# Patient Record
Sex: FEMALE | URBAN - METROPOLITAN AREA
[De-identification: names, ages, dates, MRNs, and addresses within clinical notes are randomized per-mention and may not be internally consistent; named-entity substitution may affect disease eponyms.]

---

## 2020-09-30 ENCOUNTER — OFFICE VISIT (OUTPATIENT)
Dept: URBAN - METROPOLITAN AREA CLINIC 40 | Facility: CLINIC | Age: 40
End: 2020-09-30

## 2023-12-05 ENCOUNTER — LAB OUTSIDE AN ENCOUNTER (OUTPATIENT)
Dept: URBAN - METROPOLITAN AREA CLINIC 40 | Facility: CLINIC | Age: 43
End: 2023-12-05

## 2023-12-05 ENCOUNTER — OFFICE VISIT (OUTPATIENT)
Dept: URBAN - METROPOLITAN AREA CLINIC 40 | Facility: CLINIC | Age: 43
End: 2023-12-05
Payer: COMMERCIAL

## 2023-12-05 ENCOUNTER — OFFICE VISIT (OUTPATIENT)
Dept: URBAN - METROPOLITAN AREA CLINIC 40 | Facility: CLINIC | Age: 43
End: 2023-12-05

## 2023-12-05 VITALS
WEIGHT: 167 LBS | TEMPERATURE: 97.9 F | DIASTOLIC BLOOD PRESSURE: 64 MMHG | BODY MASS INDEX: 30.73 KG/M2 | HEIGHT: 62 IN | HEART RATE: 85 BPM | SYSTOLIC BLOOD PRESSURE: 116 MMHG

## 2023-12-05 DIAGNOSIS — R10.13 EPIGASTRIC ABDOMINAL PAIN: ICD-10-CM

## 2023-12-05 DIAGNOSIS — R11.0 NAUSEA: ICD-10-CM

## 2023-12-05 PROCEDURE — 99203 OFFICE O/P NEW LOW 30 MIN: CPT | Performed by: PHYSICIAN ASSISTANT

## 2023-12-05 RX ORDER — PANTOPRAZOLE SODIUM 20 MG/1
1 TABLET TABLET, DELAYED RELEASE ORAL ONCE A DAY
Status: ACTIVE | COMMUNITY

## 2023-12-05 RX ORDER — PANTOPRAZOLE SODIUM 20 MG/1
1 TABLET TABLET, DELAYED RELEASE ORAL ONCE A DAY
Qty: 30 TABLET | Refills: 1

## 2023-12-05 RX ORDER — DICLOFENAC SODIUM 75 MG/1
1 TABLET AS NEEDED TABLET, DELAYED RELEASE ORAL TWICE A DAY
Status: ACTIVE | COMMUNITY

## 2023-12-05 NOTE — PHYSICAL EXAM GASTROINTESTINAL
Abdomen , soft,  mild TTP in ASHLY ,no guarding or rebound tenderness, nondistended , no guarding or rigidity , no masses palpable , normal bowel sounds , Liver and Spleen,  no hepatosplenomegaly , liver nontender

## 2023-12-05 NOTE — HPI-TODAY'S VISIT:
Ms. Greco is a 43-year-old  female who presents to the office today complaining of epigastric abdominal pain, nausea vomiting.  I see she was seen at [facility] ED on December 1, 2023 with these complaints and did have a CT of the abdomen and pelvis with IV contrast that was noncontributory.  Normal liver, gallbladder, biliary tree, pancreas and kidneys.  The stomach and abdominal, pelvic bowel loops are all normal as well as appendix.  Per review notes at her time of visit, she complained of pain radiating to her back and had not been seen by GI in the past.  History of asthma.  Non-smoker.  No use of alcohol or NSAIDs. She was given prescription for pantoprazole 40 mg.  I do see lipase, hepatic function panel, CBC essentially normal.  Pregnancy test negative. LMP normal. Admits she has only taken two dose of pantoprazole since ED discharge as she wanted to first discuss symptoms at our office. She has been on Prilosec in the past, which seemed to help. Dark stool after taking pepto bismol. No current rectal pain, bleeding or melena. Denies dysphagia. No chest pain, SOB reported.

## 2023-12-18 ENCOUNTER — OFFICE VISIT (OUTPATIENT)
Dept: URBAN - METROPOLITAN AREA SURGERY CENTER 30 | Facility: SURGERY CENTER | Age: 43
End: 2023-12-18

## 2023-12-18 ENCOUNTER — CLAIMS CREATED FROM THE CLAIM WINDOW (OUTPATIENT)
Dept: URBAN - METROPOLITAN AREA SURGERY CENTER 30 | Facility: SURGERY CENTER | Age: 43
End: 2023-12-18
Payer: COMMERCIAL

## 2023-12-18 DIAGNOSIS — R11.2 NAUSEA & VOMITING: ICD-10-CM

## 2023-12-18 DIAGNOSIS — R11.2 ACUTE NAUSEA WITH NONBILIOUS VOMITING: ICD-10-CM

## 2023-12-18 DIAGNOSIS — K20.80 OTHER ESOPHAGITIS WITHOUT BLEEDING: ICD-10-CM

## 2023-12-18 DIAGNOSIS — K31.89 OTHER DISEASES OF STOMACH AND DUODENUM: ICD-10-CM

## 2023-12-18 DIAGNOSIS — R10.13 EPIGASTRIC ABDOMINAL PAIN: ICD-10-CM

## 2023-12-18 DIAGNOSIS — K29.30 CHRONIC SUPERFICIAL GASTRITIS: ICD-10-CM

## 2023-12-18 DIAGNOSIS — R10.13 ABDOMINAL DISCOMFORT, EPIGASTRIC: ICD-10-CM

## 2023-12-18 DIAGNOSIS — K29.70 GASTRITIS: ICD-10-CM

## 2023-12-18 PROCEDURE — G8907 PT DOC NO EVENTS ON DISCHARG: HCPCS | Performed by: INTERNAL MEDICINE

## 2023-12-18 PROCEDURE — 43239 EGD BIOPSY SINGLE/MULTIPLE: CPT | Performed by: INTERNAL MEDICINE

## 2023-12-18 PROCEDURE — 00731 ANES UPR GI NDSC PX NOS: CPT | Performed by: NURSE ANESTHETIST, CERTIFIED REGISTERED

## 2024-01-04 ENCOUNTER — TELEPHONE ENCOUNTER (OUTPATIENT)
Dept: URBAN - METROPOLITAN AREA CLINIC 40 | Facility: CLINIC | Age: 44
End: 2024-01-04

## 2024-01-04 RX ORDER — PANTOPRAZOLE SODIUM 20 MG/1
1 TABLET TABLET, DELAYED RELEASE ORAL ONCE A DAY
Qty: 30 TABLET | Refills: 1

## 2024-01-08 ENCOUNTER — OFFICE VISIT (OUTPATIENT)
Dept: URBAN - METROPOLITAN AREA CLINIC 40 | Facility: CLINIC | Age: 44
End: 2024-01-08
Payer: COMMERCIAL

## 2024-01-08 VITALS
HEART RATE: 74 BPM | WEIGHT: 168 LBS | HEIGHT: 62 IN | SYSTOLIC BLOOD PRESSURE: 128 MMHG | DIASTOLIC BLOOD PRESSURE: 70 MMHG | TEMPERATURE: 97 F | BODY MASS INDEX: 30.91 KG/M2

## 2024-01-08 DIAGNOSIS — K29.70 GASTRITIS: ICD-10-CM

## 2024-01-08 DIAGNOSIS — R10.13 EPIGASTRIC ABDOMINAL PAIN: ICD-10-CM

## 2024-01-08 DIAGNOSIS — R11.0 NAUSEA: ICD-10-CM

## 2024-01-08 DIAGNOSIS — R89.7 ABNORMAL BIOPSY RESULT: ICD-10-CM

## 2024-01-08 PROBLEM — 165325009: Status: ACTIVE | Noted: 2024-01-08

## 2024-01-08 PROCEDURE — 99214 OFFICE O/P EST MOD 30 MIN: CPT | Performed by: PHYSICIAN ASSISTANT

## 2024-01-08 RX ORDER — PANTOPRAZOLE SODIUM 20 MG/1
1 TABLET TABLET, DELAYED RELEASE ORAL ONCE A DAY
Qty: 30 TABLET | Refills: 1 | Status: ACTIVE | COMMUNITY

## 2024-01-08 RX ORDER — PANTOPRAZOLE SODIUM 20 MG/1
1 TABLET TABLET, DELAYED RELEASE ORAL ONCE A DAY
OUTPATIENT

## 2024-01-08 RX ORDER — FOLIC ACID 1 MG/1
1 TABLET TABLET ORAL ONCE A DAY
Status: ACTIVE | COMMUNITY

## 2024-01-08 NOTE — HPI-TODAY'S VISIT:
Ms. Greco is a 43-year-old  female who was seen 12/5/23, complaining of epigastric abdominal pain, nausea vomiting.  I see she was seen at [facility] ED on December 1, 2023 with these complaints and did have a CT of the abdomen and pelvis with IV contrast that was noncontributory.  Normal liver, gallbladder, biliary tree, pancreas and kidneys.  The stomach and abdominal, pelvic bowel loops are all normal as well as appendix.  Per review notes at her time of visit, she complained of pain radiating to her back and had not been seen by GI in the past.  History of asthma.  Non-smoker.  No use of alcohol or NSAIDs. She was given prescription for pantoprazole 40 mg.  I do see lipase, hepatic function panel, CBC essentially normal.  Pregnancy test negative. LMP normal. Admits she has only taken two dose of pantoprazole since ED discharge as she wanted to first discuss symptoms at our office. She has been on Prilosec in the past, which seemed to help. Dark stool after taking pepto bismol. No current rectal pain, bleeding or melena. Denies dysphagia. No chest pain, SOB reported. She did have an EGD December 18, 2023 by Dr. Liu with findings of gastritis.  This was moderate.  Normal duodenum.  Per pathology, small bowel biopsies noted slightly increased intraepithelial lymphocytes that could have been due to NSAID use, H. pylori and, less likely IBD or other autoimmune disorder.  Slight gastritis with biopsies from the stomach but no evidence of H. pylori.  Lower esophageal biopsies with slight chronic inflammation, no Ware's metaplasia or eosinophils.  Serology recommended for follow-up. Patient reports she still continues to have some epigastric abdominal pain but is better since she has been taking pantoprazole.  There looks like her HIDA did show slightly abnormal gallbladder ejection fraction of 29% and she states that with injection of contrast, she has severe abdominal pain that resolved spontaneously.  No nausea or vomiting.  Admits that certain foods such as spicy food may trigger hemorrhoids, currently asymptomatic.  She has no past history of gallbladder disease.  No new complaints.  Rare constipation.  States that samples of MiraLAX to go up to 2 to 3 days to be effective and not using them currently.

## 2024-01-10 ENCOUNTER — TELEPHONE ENCOUNTER (OUTPATIENT)
Dept: URBAN - METROPOLITAN AREA CLINIC 23 | Facility: CLINIC | Age: 44
End: 2024-01-10

## 2024-02-06 LAB
IMMUNOGLOBULIN A: 263
INTERPRETATION: (no result)
TISSUE TRANSGLUTAMINASE AB, IGA: <1

## 2024-02-07 LAB — H PYLORI BREATH TEST: NOT DETECTED

## 2024-02-12 ENCOUNTER — OV EP (OUTPATIENT)
Dept: URBAN - METROPOLITAN AREA CLINIC 40 | Facility: CLINIC | Age: 44
End: 2024-02-12
Payer: COMMERCIAL

## 2024-02-12 VITALS
TEMPERATURE: 97.9 F | SYSTOLIC BLOOD PRESSURE: 120 MMHG | DIASTOLIC BLOOD PRESSURE: 86 MMHG | HEART RATE: 79 BPM | HEIGHT: 62 IN | BODY MASS INDEX: 30.91 KG/M2 | WEIGHT: 168 LBS

## 2024-02-12 DIAGNOSIS — R10.13 EPIGASTRIC ABDOMINAL PAIN: ICD-10-CM

## 2024-02-12 DIAGNOSIS — R89.7 ABNORMAL BIOPSY RESULT: ICD-10-CM

## 2024-02-12 DIAGNOSIS — R11.0 NAUSEA: ICD-10-CM

## 2024-02-12 DIAGNOSIS — K29.70 GASTRITIS: ICD-10-CM

## 2024-02-12 PROCEDURE — 99214 OFFICE O/P EST MOD 30 MIN: CPT | Performed by: PHYSICIAN ASSISTANT

## 2024-02-12 RX ORDER — PANTOPRAZOLE SODIUM 20 MG/1
1 TABLET TABLET, DELAYED RELEASE ORAL ONCE A DAY
Qty: 30 TABLET | Refills: 5

## 2024-02-12 RX ORDER — FOLIC ACID 1 MG/1
1 TABLET TABLET ORAL ONCE A DAY
Status: ON HOLD | COMMUNITY

## 2024-02-12 RX ORDER — PANTOPRAZOLE SODIUM 20 MG/1
1 TABLET TABLET, DELAYED RELEASE ORAL ONCE A DAY
Status: ACTIVE | COMMUNITY

## 2024-02-12 NOTE — HPI-TODAY'S VISIT:
Ms. Greco is a 44 year-old  female who was seen 1/8/24, complaining of epigastric abdominal pain, nausea vomiting.  She was seen at [facility] ED on December 1, 2023 with these complaints and did have a CT of the abdomen and pelvis with IV contrast that was noncontributory.  Normal liver, gallbladder, biliary tree, pancreas and kidneys.  The stomach and abdominal, pelvic bowel loops are all normal as well as appendix.  Per review notes at her time of visit, she complained of pain radiating to her back and had not been seen by GI in the past.  History of asthma.  Non-smoker.  No use of alcohol or NSAIDs. She was given prescription for pantoprazole 40 mg.  I do see lipase, hepatic function panel, CBC essentially normal.  Pregnancy test negative. LMP normal. Admits she has only taken two dose of pantoprazole since ED discharge as she wanted to first discuss symptoms at our office. She has been on Prilosec in the past, which seemed to help. Dark stool after taking pepto bismol. No current rectal pain, bleeding or melena. Denies dysphagia. No chest pain, SOB reported. She did have an EGD December 18, 2023 by Dr. Liu with findings of gastritis.  This was moderate.  Normal duodenum.  Per pathology, small bowel biopsies noted slightly increased intraepithelial lymphocytes that could have been due to NSAID use, H. pylori and, less likely IBD or other autoimmune disorder.  Slight gastritis with biopsies from the stomach but no evidence of H. pylori.  Lower esophageal biopsies with slight chronic inflammation, no Ware's metaplasia or eosinophils.  Serology recommended for follow-up. Her HIDA did show slightly abnormal gallbladder ejection fraction of 29% and she states that with injection of contrast, she has severe abdominal pain that resolved spontaneously.  No nausea or vomiting.  Admits that certain foods such as spicy food may trigger hemorrhoids, currently asymptomatic.  She has no past history of gallbladder disease.  No new complaints.  Rare constipation.  States that samples of MiraLAX to go up to 2 to 3 days to be effective and not using them currently. Celiac serology negative. HP breath test negative. She admits that after being off of PPI for her H. pylori breath test, symptoms were doing fine until she ate some pulposis that were high in fat that caused right-sided pain that radiated to the back.  The pantoprazole does seem to help.  No new complaints.  She was seen by Dr. Dejesus of surgery who gave the patient an option for elective laparoscopic cholecystectomy, but patient admits that she is hesitant to have surgery at this time.  She cites concern after a family friend had complications from gallbladder disease.  She is hoping to monitor her diet to help prevent this and avoid having her gallbladder removed surgically.

## 2024-04-11 ENCOUNTER — OV EP (OUTPATIENT)
Dept: URBAN - METROPOLITAN AREA CLINIC 40 | Facility: CLINIC | Age: 44
End: 2024-04-11